# Patient Record
Sex: FEMALE | Race: WHITE
[De-identification: names, ages, dates, MRNs, and addresses within clinical notes are randomized per-mention and may not be internally consistent; named-entity substitution may affect disease eponyms.]

---

## 2022-12-28 ENCOUNTER — HOSPITAL ENCOUNTER (OUTPATIENT)
Dept: HOSPITAL 47 - ORWHC2ENDO | Age: 52
Discharge: HOME | End: 2022-12-28
Attending: INTERNAL MEDICINE
Payer: COMMERCIAL

## 2022-12-28 VITALS — RESPIRATION RATE: 16 BRPM | SYSTOLIC BLOOD PRESSURE: 121 MMHG | DIASTOLIC BLOOD PRESSURE: 81 MMHG | HEART RATE: 63 BPM

## 2022-12-28 VITALS — BODY MASS INDEX: 28.3 KG/M2

## 2022-12-28 VITALS — TEMPERATURE: 97.7 F

## 2022-12-28 DIAGNOSIS — Z79.899: ICD-10-CM

## 2022-12-28 DIAGNOSIS — K57.30: ICD-10-CM

## 2022-12-28 DIAGNOSIS — K51.90: Primary | ICD-10-CM

## 2022-12-28 PROCEDURE — 45380 COLONOSCOPY AND BIOPSY: CPT

## 2022-12-28 PROCEDURE — 81025 URINE PREGNANCY TEST: CPT

## 2022-12-28 PROCEDURE — 88305 TISSUE EXAM BY PATHOLOGIST: CPT

## 2022-12-28 NOTE — P.PCN
Date of Procedure: 12/28/22


Procedure(s) Performed: 


BRIEF HISTORY: Patient is a 52-year-old pleasant female scheduled for an 

elective colonoscopy as a part of surveillance of long-standing history of 

ulcerative colitis diagnosed in December.  She was diagnosed with ulcerative 

colitis by Dr. Rider at [facility] in 2015.  The patient was treated with 

Symphoni injections every 4 weeks and was in clinical remission.  She 

discontinued it about 2 years ago was of insurance reasons.  Lately she is been 

having 5-6 loose watery bowel movements with occasional blood in the stool.





PROCEDURE PERFORMED: Colonoscopy random biopsies . 





PREOPERATIVE DIAGNOSIS: history of ulcerative colitis





IV sedation per Anesthesia. 





PROCEDURE: After informed consent was obtained, the patient, was brought into 

the endoscopy unit. IV sedation was administered by Anesthesia under continuous 

monitoring.  Digital rectal examination was normal. Initially the Olympus CF-160

flexible video colonoscope was then inserted in the rectum, gradually advanced 

into the cecum without any difficulty. Careful examination was performed as the 

scope was gradually being withdrawn. Ileocecal valve and the appendiceal orifice

were visualized and appeared normal.  Prep was excellent. terminal ileum was 

intubated and 20 cm visualized and appeared normal.   Mucosa of the cecum, 

ascending colon, transverse colon, descending colon, sigmoid colon, and rectum 

appeared normal. random biopsies were done from rectum to cecum with every 10 cm

into well.  Scattered sigmoid diverticulosis seen.   Retroflexion was performed 

in the rectum and no lesions were seen. The patient tolerated the procedure 

well. 





IMPRESSION: 


Normal-appearing colon from rectum to cecum  with no evidence of active colitis 

or colorectal neoplasia .


Scattered sigmoidal diverticula





RECOMMENDATIONS:  Findings of this examination were discussed with the patient 

as well as a family.  She was advised to follow with the biopsy results.  She'll

be seen in office in 2-3 weeks.. RENETTA Davis [facility]